# Patient Record
Sex: MALE | Race: WHITE | Employment: FULL TIME | URBAN - METROPOLITAN AREA
[De-identification: names, ages, dates, MRNs, and addresses within clinical notes are randomized per-mention and may not be internally consistent; named-entity substitution may affect disease eponyms.]

---

## 2020-12-23 ENCOUNTER — OFFICE VISIT (OUTPATIENT)
Dept: FAMILY MEDICINE | Facility: CLINIC | Age: 30
End: 2020-12-23
Payer: COMMERCIAL

## 2020-12-23 VITALS
BODY MASS INDEX: 33.43 KG/M2 | OXYGEN SATURATION: 95 % | HEIGHT: 66 IN | TEMPERATURE: 98.5 F | WEIGHT: 208 LBS | HEART RATE: 78 BPM | DIASTOLIC BLOOD PRESSURE: 81 MMHG | SYSTOLIC BLOOD PRESSURE: 137 MMHG

## 2020-12-23 DIAGNOSIS — Z00.00 ROUTINE GENERAL MEDICAL EXAMINATION AT A HEALTH CARE FACILITY: Primary | ICD-10-CM

## 2020-12-23 DIAGNOSIS — Z13.1 SCREENING FOR DIABETES MELLITUS: ICD-10-CM

## 2020-12-23 DIAGNOSIS — Z78.9 NO KNOWN HEALTH PROBLEMS: ICD-10-CM

## 2020-12-23 DIAGNOSIS — Z13.6 CARDIOVASCULAR SCREENING; LDL GOAL LESS THAN 160: ICD-10-CM

## 2020-12-23 PROCEDURE — 99385 PREV VISIT NEW AGE 18-39: CPT | Performed by: NURSE PRACTITIONER

## 2020-12-23 SDOH — HEALTH STABILITY: MENTAL HEALTH: HOW OFTEN DO YOU HAVE A DRINK CONTAINING ALCOHOL?: NEVER

## 2020-12-23 SDOH — HEALTH STABILITY: MENTAL HEALTH: HOW MANY STANDARD DRINKS CONTAINING ALCOHOL DO YOU HAVE ON A TYPICAL DAY?: NOT ASKED

## 2020-12-23 SDOH — HEALTH STABILITY: MENTAL HEALTH: HOW OFTEN DO YOU HAVE 6 OR MORE DRINKS ON ONE OCCASION?: NEVER

## 2020-12-23 ASSESSMENT — MIFFLIN-ST. JEOR: SCORE: 1852.23

## 2020-12-23 NOTE — PROGRESS NOTES
3  SUBJECTIVE:   CC: Jim Mathew is an 30 year old male who presents for preventive health visit.       Patient has been advised of split billing requirements and indicates understanding: Yes  Healthy Habits:    Do you get at least three servings of calcium containing foods daily (dairy, green leafy vegetables, etc.)? yes    Amount of exercise or daily activities, outside of work: 1-2 day(s) per week    Problems taking medications regularly No    Medication side effects: No    Have you had an eye exam in the past two years? yes    Do you see a dentist twice per year? no    Do you have sleep apnea, excessive snoring or daytime drowsiness?no          Today's PHQ-2 Score:   PHQ-2 ( 1999 [employer]) 12/23/2020   Q1: Little interest or pleasure in doing things 0   Q2: Feeling down, depressed or hopeless 0   PHQ-2 Score 0       Abuse: Current or Past(Physical, Sexual or Emotional)- No  Do you feel safe in your environment? Yes        Social History     Tobacco Use     Smoking status: Never Smoker     Smokeless tobacco: Never Used   Substance Use Topics     Alcohol use: Never     Frequency: Never     Binge frequency: Never     If you drink alcohol do you typically have >3 drinks per day or >7 drinks per week? No                      Last PSA: No results found for: PSA    Reviewed orders with patient. Reviewed health maintenance and updated orders accordingly - Yes  Lab work is in process  Labs reviewed in EPIC  BP Readings from Last 3 Encounters:   12/23/20 137/81    Wt Readings from Last 3 Encounters:   12/23/20 94.3 kg (208 lb)                  Patient Active Problem List   Diagnosis     No known health problems     CARDIOVASCULAR SCREENING; LDL GOAL LESS THAN 160     History reviewed. No pertinent surgical history.    Social History     Tobacco Use     Smoking status: Never Smoker     Smokeless tobacco: Never Used   Substance Use Topics     Alcohol use: Never     Frequency: Never     Binge frequency: Never     Family  "History   Problem Relation Age of Onset     Cancer No family hx of          No current outpatient medications on file.     No Known Allergies  No lab results found.     Reviewed and updated as needed this visit by clinical staff  Tobacco  Allergies  Meds              Reviewed and updated as needed this visit by Provider                History reviewed. No pertinent past medical history.   History reviewed. No pertinent surgical history.    ROS:  CONSTITUTIONAL: NEGATIVE for fever, chills, change in weight  INTEGUMENTARY/SKIN: NEGATIVE for worrisome rashes, moles or lesions  EYES: NEGATIVE for vision changes or irritation  ENT: NEGATIVE for ear, mouth and throat problems  RESP: NEGATIVE for significant cough or SOB  CV: NEGATIVE for chest pain, palpitations or peripheral edema  GI: NEGATIVE for nausea, abdominal pain, heartburn, or change in bowel habits   male: negative for dysuria, hematuria, decreased urinary stream, erectile dysfunction, urethral discharge  MUSCULOSKELETAL: NEGATIVE for significant arthralgias or myalgia  NEURO: NEGATIVE for weakness, dizziness or paresthesias  PSYCHIATRIC: NEGATIVE for changes in mood or affect    OBJECTIVE:   /81 (BP Location: Left arm, Patient Position: Chair, Cuff Size: Adult Large)   Pulse 78   Temp 98.5  F (36.9  C) (Oral)   Ht 1.67 m (5' 5.75\")   Wt 94.3 kg (208 lb)   SpO2 95%   BMI 33.83 kg/m    EXAM:  GENERAL: healthy, alert and no distress  EYES: Eyes grossly normal to inspection, PERRL and conjunctivae and sclerae normal  HENT: ear canals and TM's normal, nose and mouth without ulcers or lesions  NECK: no adenopathy, no asymmetry, masses, or scars and thyroid normal to palpation  RESP: lungs clear to auscultation - no rales, rhonchi or wheezes  CV: regular rate and rhythm, normal S1 S2, no S3 or S4, no murmur, click or rub, no peripheral edema and peripheral pulses strong  ABDOMEN: soft, nontender, no hepatosplenomegaly, no masses and bowel sounds " "normal  MS: no gross musculoskeletal defects noted, no edema  SKIN: no suspicious lesions or rashes  NEURO: Normal strength and tone, mentation intact and speech normal  PSYCH: mentation appears normal, affect normal/bright    Diagnostic Test Results:  Labs reviewed in Epic    ASSESSMENT/PLAN:   1. Routine general medical examination at a health care facility      2. No known health problems      3. CARDIOVASCULAR SCREENING; LDL GOAL LESS THAN 160  Not fasting today. He was schedule fasting labs in the future.  - Lipid panel reflex to direct LDL Fasting; Future    4. Screening for diabetes mellitus  Not fasting today. He was schedule fasting labs in the future.  - **Glucose FUTURE anytime; Future    Patient has been advised of split billing requirements and indicates understanding: Yes  COUNSELING:  Reviewed preventive health counseling, as reflected in patient instructions       Regular exercise       Healthy diet/nutrition    Estimated body mass index is 33.83 kg/m  as calculated from the following:    Height as of this encounter: 1.67 m (5' 5.75\").    Weight as of this encounter: 94.3 kg (208 lb).    Weight management plan: Discussed healthy diet and exercise guidelines    He reports that he has never smoked. He has never used smokeless tobacco.      Counseling Resources:  ATP IV Guidelines  Pooled Cohorts Equation Calculator  FRAX Risk Assessment  ICSI Preventive Guidelines  Dietary Guidelines for Americans, 2010  USDA's MyPlate  ASA Prophylaxis  Lung CA Screening    Jazz Rucker, BIPIN [facility]    This visit took place during the COVID 19 global pandemic.   PPE worn during the visit included: surgical mask and face shield by me and mask by patient     "

## 2021-01-19 ENCOUNTER — NURSE TRIAGE (OUTPATIENT)
Dept: FAMILY MEDICINE | Facility: CLINIC | Age: 31
End: 2021-01-19

## 2021-01-19 ENCOUNTER — OFFICE VISIT (OUTPATIENT)
Dept: URGENT CARE | Facility: URGENT CARE | Age: 31
End: 2021-01-19
Payer: COMMERCIAL

## 2021-01-19 VITALS
SYSTOLIC BLOOD PRESSURE: 142 MMHG | BODY MASS INDEX: 32.14 KG/M2 | OXYGEN SATURATION: 98 % | RESPIRATION RATE: 20 BRPM | TEMPERATURE: 99.4 F | WEIGHT: 197.6 LBS | HEART RATE: 78 BPM | DIASTOLIC BLOOD PRESSURE: 78 MMHG

## 2021-01-19 DIAGNOSIS — R20.0 NUMBNESS OF LIP: Primary | ICD-10-CM

## 2021-01-19 DIAGNOSIS — H04.123 DRY EYES: ICD-10-CM

## 2021-01-19 PROCEDURE — 99213 OFFICE O/P EST LOW 20 MIN: CPT | Performed by: PHYSICIAN ASSISTANT

## 2021-01-19 ASSESSMENT — ENCOUNTER SYMPTOMS: FATIGUE: 1

## 2021-01-19 NOTE — TELEPHONE ENCOUNTER
Patient has had flu like symptoms x 3 weeks, has had improvement, however developed upper lip numbness ~ 24  Hours ago.   Notes significant dryness of right eye.  Denies ptosis of eye lids.  States smile is unchanged from what it normally is.  Always has had slight higher smile on one side vs other.  Patient is alert and oriented x 3.  Denies numbness/tingling/weakness other areas of body.    Patient is currently at work, will go to Urgent Care for evaluation.   Patient/parent verbalized understanding of instructions provided and agreed with the plan of care\      Additional Information    Negative: Difficult to awaken or acting confused (e.g., disoriented, slurred speech)    Negative: New neurologic deficit that is present NOW, sudden onset of ANY of the following: * Weakness of the face, arm, or leg on one side of the body * Numbness of the face, arm, or leg on one side of the body * Loss of speech or garbled speech    Negative: Sounds like a life-threatening emergency to the triager    Negative: Confusion, disorientation, or hallucinations is the main symptom    Negative: Dizziness is the main symptom    Negative: Followed a head injury within last 3 days    Negative: Headache (with neurologic deficit)    Negative: Unable to urinate (or only a few drops) and bladder feels very full    Negative: Loss of control of bowel or bladder (i.e., incontinence) of new onset    Negative: Back pain with numbness (loss of sensation) in groin or rectal area    Negative: Patient sounds very sick or weak to the triager    Negative: Neurologic deficit that was brief (now gone), ANY of the following: * Weakness of the face, arm, or leg on one side of the body * Numbness of the face, arm, or leg on one side of the body * Loss of speech or garbled speech    Negative: Neurologic deficit of gradual onset, ANY of the following: * Weakness of the face, arm, or leg on one side of the body * Numbness of the face, arm, or leg on one side of  "the body * Loss of speech or garbled speech    Ben[place]ia palsy suspected (i.e., weakness only one side of the face, developing over hours to days, no other symptoms)    Answer Assessment - Initial Assessment Questions  1. SYMPTOM: \"What is the main symptom you are concerned about?\" (e.g., weakness, numbness)      Upper lip numb, \"Like I went to the dentist\"  2. ONSET: \"When did this start?\" (minutes, hours, days; while sleeping)      24 hours  3. LAST NORMAL: \"When was the last time you were normal (no symptoms)?\"      Yesterday morning  4. PATTERN \"Does this come and go, or has it been constant since it started?\"  \"Is it present now?\"      constant  5. CARDIAC SYMPTOMS: \"Have you had any of the following symptoms: chest pain, difficulty breathing, palpitations?\"      Denies   6. NEUROLOGIC SYMPTOMS: \"Have you had any of the following symptoms: headache, dizziness, vision loss, double vision, changes in speech, unsteady on your feet?\"      Speaking in full clear sentences. Reports Fatigue Denies other symptoms  7. OTHER SYMPTOMS: \"Do you have any other symptoms?\"      Denies  8. PREGNANCY: \"Is there any chance you are pregnant?\" \"When was your last menstrual period?\"      NA    Protocols used: NEUROLOGIC DEFICIT-A-[place]      "

## 2021-01-20 NOTE — PROGRESS NOTES
SUBJECTIVE:   Jim Mathew is a 30 year old male presenting with a chief complaint of   Chief Complaint   Patient presents with     Numbness     Upper lip numbness started yesterday. Pt just getting over flu sx that lasted 2.5 weeks       He is an established patient of [facility].  Patient presents upper lip numbness and right eye dryness.  Patient does wear contacts and has them in now..  Eyes tearing.  No trauma, no discharge.        Review of Systems   Constitutional: Positive for fatigue.   HENT:        Upper lip numbness   Eyes:        Right eye dryness.   All other systems reviewed and are negative.      History reviewed. No pertinent past medical history.  Family History   Problem Relation Age of Onset     Cancer No family hx of      No current outpatient medications on file.     Social History     Tobacco Use     Smoking status: Never Smoker     Smokeless tobacco: Never Used   Substance Use Topics     Alcohol use: Never     Frequency: Never     Binge frequency: Never       OBJECTIVE  BP (!) 142/78 (BP Location: Left arm, Patient Position: Sitting, Cuff Size: Adult Large)   Pulse 78   Temp 99.4  F (37.4  C) (Tympanic)   Resp 20   Wt 89.6 kg (197 lb 9.6 oz)   SpO2 98%   BMI 32.14 kg/m      Physical Exam  Vitals signs and nursing note reviewed.   Constitutional:       Appearance: Normal appearance. He is normal weight.   HENT:      Head: Normocephalic and atraumatic.      Right Ear: Tympanic membrane, ear canal and external ear normal.      Left Ear: Tympanic membrane, ear canal and external ear normal.      Mouth/Throat:      Mouth: Mucous membranes are moist.      Pharynx: Oropharynx is clear.      Comments: Upper lip appears normal.  No lesions  Eyes:      Extraocular Movements: Extraocular movements intact.      Conjunctiva/sclera: Conjunctivae normal.      Pupils: Pupils are equal, round, and reactive to light.      Comments: Bilaterally mildly erythematous conjunctiva.  No discharge.     Neck:       Musculoskeletal: Normal range of motion and neck supple.   Cardiovascular:      Rate and Rhythm: Normal rate and regular rhythm.      Pulses: Normal pulses.      Heart sounds: Normal heart sounds.   Pulmonary:      Effort: Pulmonary effort is normal.      Breath sounds: Normal breath sounds.   Skin:     General: Skin is warm and dry.      Capillary Refill: Capillary refill takes less than 2 seconds.   Neurological:      General: No focal deficit present.      Mental Status: He is alert.   Psychiatric:         Mood and Affect: Mood normal.         Behavior: Behavior normal.         Labs:  No results found for this or any previous visit (from the past 24 hour(s)).    X-Ray was not done.    ASSESSMENT:      ICD-10-CM    1. Numbness of lip  R20.0    2. Dry eyes  H04.123         Medical Decision Making:    Differential Diagnosis:  Dry eyes, contact irritation.    Serious Comorbid Conditions:  Adult:  None    PLAN:    Recommended patient not wear contacts and use OTC eye lubricating drops.  Discussed reasons to seek immediate medical attention.        Followup:    If not improving or if condition worsens, follow up with your Primary Care Provider, If not improving or if conditions worsens over the next 12-24 hours, go to the Emergency Department    Patient Instructions     Patient Education     Treating Dry Eyes    Artificial tears are the most common treatment for dry eyes. If they don t relieve your symptoms, your eye healthcare provider may put in plugs. Or you may have surgery to stop the draining and increase the tear film.  Artificial tears  Artificial tears, or lubricating eye drops, replace your natural lubricating tears. You can buy most lubricating eye drops without a prescription. And you can use them as often as needed. Lubricating eye drops are not the same as eye drops used to relieve redness or itching. Check with your eye healthcare provider or pharmacist to be sure you buy the right drops.  Some  lubricating eye drops have chemicals called preservatives. This makes them last longer. Your eyes may be sensitive to these drops. Or you may need to use them often. If so, you may want to buy lubricating eye drops made without preservatives. Your eye healthcare provider may also suggest using a lubricating eye ointment at night.  Medicine  Your eye healthcare provider may prescribe medicine such as cyclosporine to treat your eye condition. It can help increase your eyes' ability to make tears.  Plugs  Closing the puncta with plugs can help keep the tear film on your eye. The plug acts like a stopper in a sink. It allows only a small amount of tears to drain out of your eye. Your eye healthcare provider may first try short-term (temporary) plugs that dissolve in a few days. If these help, he or she may then put in long-term plugs. Your eyes will be numbed with drops when the plugs are inserted. You shouldn t feel any pain. And you shouldn t feel the plugs once they re in.   Surgery  If artificial tears or plugs don t relieve your dry eyes, surgery may be an option. Your eye healthcare provider may do minor outpatient surgery to narrow or block the openings to the drainage canals. If your dry eyes are caused by eyelid problems, your eye healthcare provider may recommend other kinds of surgery.  [employer] last reviewed this educational content on 11/1/2017 2000-2020 [employer]. [address] [place] [id number]. All rights reserved. This information is not intended as a substitute for professional medical care. Always follow your healthcare professional's instructions.           Patient Education     What Are Dry Eyes?    Do your eyes ever sting, burn, or feel scratchy? To be comfortable, your eyes need to be bathed, or lubricated, with tears. Normally, there is always a film of tears on the surface of your eyes. But if your eyes don t produce enough tears, the surface gets irritated. This is  known as dry eyes.  Not enough lubricating tears  When you cry, get something in your eye, or have an infection, your eyes make reflex tears. Each time you blink, another kind of tears, called lubricating tears, spread over the surface of your eyes. These tears keep the eyes moist and comfortable. You aren t aware of these tears because they stay on the surface of your eyes.  Without lubricating tears, your eyes get dry. Then they burn or sting and feel scratchy. They may also water. But this doesn t relieve the dryness. That's because the eyes water with reflex tears, not lubricating tears.  What causes dry eyes?    Aging    Heaters and air conditioners    Wind, smoke, or dry weather    Allergies, such as hay fever    Medicines    Eyelid problems, injuries to the eye, or diseases like rheumatoid arthritis    How lubricating tears flow  Lubricating tears flow from glands in your upper eyelid over the surface of your eye. From your eye, the tears drain into puncta, which connect to drainage canals that lead to your nose.  [employer] last reviewed this educational content on 11/1/2017 2000-2020 [employer]. [address] [place] [address], [place] [address]. All rights reserved. This information is not intended as a substitute for professional medical care. Always follow your healthcare professional's instructions.

## 2021-01-20 NOTE — PATIENT INSTRUCTIONS
Patient Education     Treating Dry Eyes    Artificial tears are the most common treatment for dry eyes. If they don t relieve your symptoms, your eye healthcare provider may put in plugs. Or you may have surgery to stop the draining and increase the tear film.  Artificial tears  Artificial tears, or lubricating eye drops, replace your natural lubricating tears. You can buy most lubricating eye drops without a prescription. And you can use them as often as needed. Lubricating eye drops are not the same as eye drops used to relieve redness or itching. Check with your eye healthcare provider or pharmacist to be sure you buy the right drops.  Some lubricating eye drops have chemicals called preservatives. This makes them last longer. Your eyes may be sensitive to these drops. Or you may need to use them often. If so, you may want to buy lubricating eye drops made without preservatives. Your eye healthcare provider may also suggest using a lubricating eye ointment at night.  Medicine  Your eye healthcare provider may prescribe medicine such as cyclosporine to treat your eye condition. It can help increase your eyes' ability to make tears.  Plugs  Closing the puncta with plugs can help keep the tear film on your eye. The plug acts like a stopper in a sink. It allows only a small amount of tears to drain out of your eye. Your eye healthcare provider may first try short-term (temporary) plugs that dissolve in a few days. If these help, he or she may then put in long-term plugs. Your eyes will be numbed with drops when the plugs are inserted. You shouldn t feel any pain. And you shouldn t feel the plugs once they re in.   Surgery  If artificial tears or plugs don t relieve your dry eyes, surgery may be an option. Your eye healthcare provider may do minor outpatient surgery to narrow or block the openings to the drainage canals. If your dry eyes are caused by eyelid problems, your eye healthcare provider may recommend other  kinds of surgery.  [employer] last reviewed this educational content on 11/1/2017 2000-2020 [employer]. [address] [place] [address]. All rights reserved. This information is not intended as a substitute for professional medical care. Always follow your healthcare professional's instructions.           Patient Education     What Are Dry Eyes?    Do your eyes ever sting, burn, or feel scratchy? To be comfortable, your eyes need to be bathed, or lubricated, with tears. Normally, there is always a film of tears on the surface of your eyes. But if your eyes don t produce enough tears, the surface gets irritated. This is known as dry eyes.  Not enough lubricating tears  When you cry, get something in your eye, or have an infection, your eyes make reflex tears. Each time you blink, another kind of tears, called lubricating tears, spread over the surface of your eyes. These tears keep the eyes moist and comfortable. You aren t aware of these tears because they stay on the surface of your eyes.  Without lubricating tears, your eyes get dry. Then they burn or sting and feel scratchy. They may also water. But this doesn t relieve the dryness. That's because the eyes water with reflex tears, not lubricating tears.  What causes dry eyes?    Aging    Heaters and air conditioners    Wind, smoke, or dry weather    Allergies, such as hay fever    Medicines    Eyelid problems, injuries to the eye, or diseases like rheumatoid arthritis    How lubricating tears flow  Lubricating tears flow from glands in your upper eyelid over the surface of your eye. From your eye, the tears drain into puncta, which connect to drainage canals that lead to your nose.  [employer] last reviewed this educational content on 11/1/2017 2000-2020 [employer]. [address] [place] [id number]. All rights reserved. This information is not intended as a substitute for professional medical care. Always follow  your healthcare professional's instructions.

## 2021-10-11 ENCOUNTER — HEALTH MAINTENANCE LETTER (OUTPATIENT)
Age: 31
End: 2021-10-11

## 2022-01-30 ENCOUNTER — HEALTH MAINTENANCE LETTER (OUTPATIENT)
Age: 32
End: 2022-01-30

## 2022-09-24 ENCOUNTER — HEALTH MAINTENANCE LETTER (OUTPATIENT)
Age: 32
End: 2022-09-24

## 2023-05-08 ENCOUNTER — HEALTH MAINTENANCE LETTER (OUTPATIENT)
Age: 33
End: 2023-05-08